# Patient Record
(demographics unavailable — no encounter records)

---

## 2025-07-08 NOTE — ASSESSMENT
[FreeTextEntry1] : Mina is a pleasant 52 year-old male who presents to discuss obesity medicine options today.   I had a lengthy discussion with the patient regarding nutrition, exercise, weight loss medications.   We discussed medical weight loss management in detail. We discussed specific medications currently FDA approved including both oral (Phentermine, Contrave, Qsymia) and injectable medications (GLP-1 RA). We reviewed indications, MOA, safety/contraindications, efficacy, storage, dosage, administration, missed doses, dose titration, availability of medication, side effects/adverse reactions and insurance coverage. Patient is interested in pursuing GLP-RA therapy at this time.   I emphasized the importance of making healthier food choices including fresh fruits and vegetables, lean meats, and protein sources. I recommended front loading calories, incorporating whole grains, and eliminating fast foods. I also discussed the importance of avoiding fried/fatty foods and foods containing high sugar content including juices/shakes/sodas/desserts.   I also encouraged beginning an exercise program and recommended cardiovascular exercises along with strength training to build lean muscle. I made suggestions on different types of exercises to try.   I have discussed initiating Wegovy therapy for the patient. All risks and benefits have been discussed with the patient.   Currently, there are no contraindications for the use of Wegovy after reviewing pts medical history and labs including personal or family history of medullary thyroid cancer or MEN2.   Possible side effects were discussed with the patient including fatigue, nausea, reflux, diarrhea, eructation, constipation, delayed gastric emptying, or pancreatitis.   I will check blood work every 6 months while on GLP-1 medication.    Pt verbalizes understanding and wishes to proceed with medical therapy. Instructions for single dose GLP-1 autoinjector use provided via office demonstration. Start Wegovy 0.25mg based on authorization and tolerance. I will try to obtain authorization for Wegovy once weekly GLP-1 injection. I will expedite authorization request via [employer] and contact patient once authorization determination has been reached. Patient educated to call with questions/concerns. All questions answered.   Patient is advised to reach out to me after the 3rd dose to discuss monthly dose escalation One the patient is pleased with results, developed a steady diet plan and exercise regimen - then will slowly titrate downward based on comfortability and consider long term maintenance dose.   Patient verbalized understanding of all discussion today.   Patient will work on the following: -Eliminate snacks -Focus on eating 3 well-balanced meals during the daytime with appropriate portion size -Cooking fresh meals rather than take out/processed/ready-made foods -Incorporating exercise; walk 8-10k steps daily   Appreciate referral from Dr. Truong

## 2025-07-08 NOTE — HISTORY OF PRESENT ILLNESS
[FreeTextEntry1] : Mina is a 52 year old male who presents for initial obesity medicine consultation.   His obesity is refractory to diet and exercise efforts.   Patient is interested in exploring medical weight loss options.  Patient reports despite changes in diet and exercise they are unable to lose weight. Patient reports they do not know much about GLP-1/GIP RA therapy and would like to discuss and research more into weight loss medications.  Allergies: PCN  Current weight: 215 lbs Current BMI: 31.75 Goal weight: 195 lbs  PMHx: HTN  Assessment: - No Drug-Drug interactions or contraindications noted  Recommendation:  Start Wegovy 0.25 mg SC weekly